# Patient Record
Sex: FEMALE | Race: WHITE | NOT HISPANIC OR LATINO | URBAN - METROPOLITAN AREA
[De-identification: names, ages, dates, MRNs, and addresses within clinical notes are randomized per-mention and may not be internally consistent; named-entity substitution may affect disease eponyms.]

---

## 2017-01-25 ENCOUNTER — HISTORICAL (OUTPATIENT)
Dept: ADMINISTRATIVE | Facility: HOSPITAL | Age: 72
End: 2017-01-25

## 2017-03-14 ENCOUNTER — HISTORICAL (OUTPATIENT)
Dept: RADIOLOGY | Facility: HOSPITAL | Age: 72
End: 2017-03-14

## 2017-08-15 ENCOUNTER — HISTORICAL (OUTPATIENT)
Dept: ADMINISTRATIVE | Facility: HOSPITAL | Age: 72
End: 2017-08-15

## 2018-01-08 ENCOUNTER — HISTORICAL (OUTPATIENT)
Dept: LAB | Facility: HOSPITAL | Age: 73
End: 2018-01-08

## 2018-01-09 ENCOUNTER — HISTORICAL (OUTPATIENT)
Dept: ADMINISTRATIVE | Facility: HOSPITAL | Age: 73
End: 2018-01-09

## 2018-08-20 ENCOUNTER — HISTORICAL (OUTPATIENT)
Dept: RADIOLOGY | Facility: HOSPITAL | Age: 73
End: 2018-08-20

## 2018-09-20 ENCOUNTER — HISTORICAL (OUTPATIENT)
Dept: RADIOLOGY | Facility: HOSPITAL | Age: 73
End: 2018-09-20

## 2019-03-15 ENCOUNTER — HISTORICAL (OUTPATIENT)
Dept: RADIOLOGY | Facility: HOSPITAL | Age: 74
End: 2019-03-15

## 2019-07-31 ENCOUNTER — HISTORICAL (OUTPATIENT)
Dept: RADIOLOGY | Facility: HOSPITAL | Age: 74
End: 2019-07-31

## 2019-10-02 ENCOUNTER — HISTORICAL (OUTPATIENT)
Dept: ADMINISTRATIVE | Facility: HOSPITAL | Age: 74
End: 2019-10-02

## 2019-10-02 LAB
ABS NEUT (OLG): 2.71 X10(3)/MCL (ref 2.1–9.2)
ALBUMIN SERPL-MCNC: 4 GM/DL (ref 3.4–5)
ALBUMIN/GLOB SERPL: 1.4 RATIO (ref 1.1–2)
ALP SERPL-CCNC: 76 UNIT/L (ref 38–126)
ALT SERPL-CCNC: 35 UNIT/L (ref 12–78)
APPEARANCE, UA: CLEAR
AST SERPL-CCNC: 24 UNIT/L (ref 15–37)
BACTERIA SPEC CULT: NORMAL /HPF
BASOPHILS # BLD AUTO: 0 X10(3)/MCL (ref 0–0.2)
BASOPHILS NFR BLD AUTO: 1 %
BILIRUB SERPL-MCNC: 0.3 MG/DL (ref 0.2–1)
BILIRUB UR QL STRIP: NEGATIVE
BILIRUBIN DIRECT+TOT PNL SERPL-MCNC: 0.1 MG/DL (ref 0–0.5)
BILIRUBIN DIRECT+TOT PNL SERPL-MCNC: 0.2 MG/DL (ref 0–0.8)
BUN SERPL-MCNC: 18 MG/DL (ref 7–18)
CALCIUM SERPL-MCNC: 9.3 MG/DL (ref 8.5–10.1)
CHLORIDE SERPL-SCNC: 108 MMOL/L (ref 98–107)
CHOLEST SERPL-MCNC: 195 MG/DL (ref 0–200)
CHOLEST/HDLC SERPL: 3.4 {RATIO} (ref 0–4)
CO2 SERPL-SCNC: 29 MMOL/L (ref 21–32)
COLOR UR: YELLOW
CREAT SERPL-MCNC: 0.91 MG/DL (ref 0.55–1.02)
CREAT UR-MCNC: 143 MG/DL
DEPRECATED CALCIDIOL+CALCIFEROL SERPL-MC: 32.69 NG/ML (ref 30–80)
EOSINOPHIL # BLD AUTO: 0.1 X10(3)/MCL (ref 0–0.9)
EOSINOPHIL NFR BLD AUTO: 1 %
ERYTHROCYTE [DISTWIDTH] IN BLOOD BY AUTOMATED COUNT: 12.9 % (ref 11.5–17)
GLOBULIN SER-MCNC: 2.9 GM/DL (ref 2.4–3.5)
GLUCOSE (UA): NEGATIVE
GLUCOSE SERPL-MCNC: 91 MG/DL (ref 74–106)
HCT VFR BLD AUTO: 37.6 % (ref 37–47)
HDLC SERPL-MCNC: 58 MG/DL (ref 35–60)
HGB BLD-MCNC: 11.8 GM/DL (ref 12–16)
HGB UR QL STRIP: NEGATIVE
KETONES UR QL STRIP: NEGATIVE
LDLC SERPL CALC-MCNC: 88 MG/DL (ref 0–129)
LEUKOCYTE ESTERASE UR QL STRIP: NEGATIVE
LYMPHOCYTES # BLD AUTO: 2.7 X10(3)/MCL (ref 0.6–4.6)
LYMPHOCYTES NFR BLD AUTO: 45 %
MCH RBC QN AUTO: 30.7 PG (ref 27–31)
MCHC RBC AUTO-ENTMCNC: 31.4 GM/DL (ref 33–36)
MCV RBC AUTO: 97.9 FL (ref 80–94)
MICROALBUMIN UR-MCNC: 1.5 MG/DL
MICROALBUMIN/CREAT RATIO PNL UR: 10.5 MG/GM CR (ref 0–30)
MONOCYTES # BLD AUTO: 0.5 X10(3)/MCL (ref 0.1–1.3)
MONOCYTES NFR BLD AUTO: 8 %
NEUTROPHILS # BLD AUTO: 2.71 X10(3)/MCL (ref 2.1–9.2)
NEUTROPHILS NFR BLD AUTO: 45 %
NITRITE UR QL STRIP: NEGATIVE
PH UR STRIP: 5 [PH] (ref 5–9)
PLATELET # BLD AUTO: 294 X10(3)/MCL (ref 130–400)
PMV BLD AUTO: 10.6 FL (ref 9.4–12.4)
POTASSIUM SERPL-SCNC: 4.9 MMOL/L (ref 3.5–5.1)
PROT SERPL-MCNC: 6.9 GM/DL (ref 6.4–8.2)
PROT UR QL STRIP: NEGATIVE
RBC # BLD AUTO: 3.84 X10(6)/MCL (ref 4.2–5.4)
RBC #/AREA URNS HPF: NORMAL /[HPF]
SODIUM SERPL-SCNC: 142 MMOL/L (ref 136–145)
SP GR UR STRIP: 1.02 (ref 1–1.03)
SQUAMOUS EPITHELIAL, UA: NORMAL
TRIGL SERPL-MCNC: 244 MG/DL (ref 30–150)
TSH SERPL-ACNC: 1.68 MIU/L (ref 0.36–3.74)
UROBILINOGEN UR STRIP-ACNC: 0.2
VLDLC SERPL CALC-MCNC: 49 MG/DL
WBC # SPEC AUTO: 6 X10(3)/MCL (ref 4.5–11.5)
WBC #/AREA URNS HPF: NORMAL /[HPF]

## 2019-10-10 ENCOUNTER — HISTORICAL (OUTPATIENT)
Dept: RADIOLOGY | Facility: HOSPITAL | Age: 74
End: 2019-10-10

## 2019-10-20 LAB — HEMOCCULT SP1 STL QL: NEGATIVE

## 2019-10-21 LAB — HEMOCCULT SP2 STL QL: NEGATIVE

## 2019-10-23 ENCOUNTER — HISTORICAL (OUTPATIENT)
Dept: LAB | Facility: HOSPITAL | Age: 74
End: 2019-10-23

## 2019-11-04 ENCOUNTER — HISTORICAL (OUTPATIENT)
Dept: RADIOLOGY | Facility: HOSPITAL | Age: 74
End: 2019-11-04

## 2020-07-10 ENCOUNTER — HISTORICAL (OUTPATIENT)
Dept: ADMINISTRATIVE | Facility: HOSPITAL | Age: 75
End: 2020-07-10

## 2020-07-10 LAB
ABS NEUT (OLG): 3.68 X10(3)/MCL (ref 2.1–9.2)
BASOPHILS # BLD AUTO: 0 X10(3)/MCL (ref 0–0.2)
BASOPHILS NFR BLD AUTO: 0 %
CHOLEST SERPL-MCNC: 188 MG/DL
CHOLEST/HDLC SERPL: 4 {RATIO} (ref 0–5)
EOSINOPHIL # BLD AUTO: 0 X10(3)/MCL (ref 0–0.9)
EOSINOPHIL NFR BLD AUTO: 1 %
ERYTHROCYTE [DISTWIDTH] IN BLOOD BY AUTOMATED COUNT: 12.5 % (ref 11.5–17)
HCT VFR BLD AUTO: 36.9 % (ref 37–47)
HDLC SERPL-MCNC: 53 MG/DL (ref 35–60)
HGB BLD-MCNC: 11.8 GM/DL (ref 12–16)
LDLC SERPL CALC-MCNC: 94 MG/DL (ref 50–140)
LYMPHOCYTES # BLD AUTO: 2 X10(3)/MCL (ref 0.6–4.6)
LYMPHOCYTES NFR BLD AUTO: 31 %
MCH RBC QN AUTO: 30.9 PG (ref 27–31)
MCHC RBC AUTO-ENTMCNC: 32 GM/DL (ref 33–36)
MCV RBC AUTO: 96.6 FL (ref 80–94)
MONOCYTES # BLD AUTO: 0.6 X10(3)/MCL (ref 0.1–1.3)
MONOCYTES NFR BLD AUTO: 9 %
NEUTROPHILS # BLD AUTO: 3.68 X10(3)/MCL (ref 2.1–9.2)
NEUTROPHILS NFR BLD AUTO: 58 %
PLATELET # BLD AUTO: 279 X10(3)/MCL (ref 130–400)
PMV BLD AUTO: 10.7 FL (ref 9.4–12.4)
RBC # BLD AUTO: 3.82 X10(6)/MCL (ref 4.2–5.4)
TRIGL SERPL-MCNC: 205 MG/DL (ref 37–140)
VLDLC SERPL CALC-MCNC: 41 MG/DL
WBC # SPEC AUTO: 6.3 X10(3)/MCL (ref 4.5–11.5)

## 2021-01-05 ENCOUNTER — HISTORICAL (OUTPATIENT)
Dept: ADMINISTRATIVE | Facility: HOSPITAL | Age: 76
End: 2021-01-05

## 2021-01-05 LAB
ALBUMIN SERPL-MCNC: 4.5 G/DL (ref 3.7–4.7)
ALBUMIN/GLOB SERPL: 1.9 {RATIO} (ref 1.2–2.2)
ALP SERPL-CCNC: 72 IU/L (ref 39–117)
ALT SERPL-CCNC: 15 IU/L (ref 0–32)
AST SERPL-CCNC: 15 IU/L (ref 0–40)
BASOPHILS # BLD AUTO: 0 X10E3/UL (ref 0–0.2)
BASOPHILS NFR BLD AUTO: 1 %
BILIRUB SERPL-MCNC: 0.3 MG/DL (ref 0–1.2)
BUN SERPL-MCNC: 19 MG/DL (ref 8–27)
CALCIUM SERPL-MCNC: 10.5 MG/DL (ref 8.7–10.3)
CHLORIDE SERPL-SCNC: 102 MMOL/L (ref 96–106)
CHOLEST SERPL-MCNC: 242 MG/DL (ref 100–199)
CHOLEST/HDLC SERPL: 3.7 RATIO (ref 0–4.4)
CO2 SERPL-SCNC: 23 MMOL/L (ref 20–29)
CREAT SERPL-MCNC: 1.2 MG/DL (ref 0.57–1)
CREAT/UREA NIT SERPL: 16 (ref 12–28)
DEPRECATED CALCIDIOL+CALCIFEROL SERPL-MC: 34.8 NG/ML (ref 30–100)
EOSINOPHIL # BLD AUTO: 0.1 X10E3/UL (ref 0–0.4)
EOSINOPHIL NFR BLD AUTO: 1 %
ERYTHROCYTE [DISTWIDTH] IN BLOOD BY AUTOMATED COUNT: 13.2 % (ref 11.7–15.4)
GLOBULIN SER-MCNC: 2.4 G/DL (ref 1.5–4.5)
GLUCOSE SERPL-MCNC: 96 MG/DL (ref 65–99)
HCT VFR BLD AUTO: 37.9 % (ref 34–46.6)
HDLC SERPL-MCNC: 66 MG/DL
HGB BLD-MCNC: 12.1 G/DL (ref 11.1–15.9)
LDLC SERPL CALC-MCNC: 126 MG/DL (ref 0–99)
LYMPHOCYTES # BLD AUTO: 2.6 X10E3/UL (ref 0.7–3.1)
LYMPHOCYTES NFR BLD AUTO: 40 %
MCH RBC QN AUTO: 31.2 PG (ref 26.6–33)
MCHC RBC AUTO-ENTMCNC: 31.9 G/DL (ref 31.5–35.7)
MCV RBC AUTO: 98 FL (ref 79–97)
MONOCYTES # BLD AUTO: 0.6 X10E3/UL (ref 0.1–0.9)
MONOCYTES NFR BLD AUTO: 8 %
NEUTROPHILS # BLD AUTO: 3.3 X10E3/UL (ref 1.4–7)
NEUTROPHILS NFR BLD AUTO: 50 %
PLATELET # BLD AUTO: 293 X10E3/UL (ref 150–450)
POTASSIUM SERPL-SCNC: 5.2 MMOL/L (ref 3.5–5.2)
PROT SERPL-MCNC: 6.9 G/DL (ref 6–8.5)
RBC # BLD AUTO: 3.88 X10(6)/MCL (ref 3.77–5.28)
SODIUM SERPL-SCNC: 141 MMOL/L (ref 134–144)
TRIGL SERPL-MCNC: 286 MG/DL (ref 0–149)
TSH SERPL-ACNC: 3.05 MIU/ML (ref 0.45–4.5)
VLDLC SERPL CALC-MCNC: 50 MG/DL (ref 5–40)
WBC # SPEC AUTO: 6.7 X10E3/UL (ref 3.4–10.8)

## 2022-01-17 ENCOUNTER — HISTORICAL (OUTPATIENT)
Dept: ADMINISTRATIVE | Facility: HOSPITAL | Age: 77
End: 2022-01-17

## 2022-01-17 LAB
BASOPHILS # BLD AUTO: 0 X10E3/UL (ref 0–0.2)
BASOPHILS NFR BLD AUTO: 1 %
DEPRECATED CALCIDIOL+CALCIFEROL SERPL-MC: 28.4 NG/ML (ref 30–100)
EOSINOPHIL # BLD AUTO: 0.1 X10E3/UL (ref 0–0.4)
EOSINOPHIL NFR BLD AUTO: 2 %
ERYTHROCYTE [DISTWIDTH] IN BLOOD BY AUTOMATED COUNT: 13 % (ref 11.7–15.4)
HBA1C MFR BLD: 6 % (ref 4.8–5.6)
HCT VFR BLD AUTO: 37.6 % (ref 34–46.6)
HGB BLD-MCNC: 11.8 G/DL (ref 11.1–15.9)
LYMPHOCYTES # BLD AUTO: 2.5 X10E3/UL (ref 0.7–3.1)
LYMPHOCYTES NFR BLD AUTO: 41 %
MCH RBC QN AUTO: 31.2 PG (ref 26.6–33)
MCHC RBC AUTO-ENTMCNC: 31.4 G/DL (ref 31.5–35.7)
MCV RBC AUTO: 100 FL (ref 79–97)
MONOCYTES # BLD AUTO: 0.6 X10E3/UL (ref 0.1–0.9)
MONOCYTES NFR BLD AUTO: 10 %
NEUTROPHILS # BLD AUTO: 2.9 X10E3/UL (ref 1.4–7)
NEUTROPHILS NFR BLD AUTO: 46 %
PLATELET # BLD AUTO: 319 X10E3/UL (ref 150–450)
RBC # BLD AUTO: 3.78 X10(6)/MCL (ref 3.77–5.28)
TSH SERPL-ACNC: 4.18 MIU/ML (ref 0.45–4.5)
WBC # SPEC AUTO: 6.1 X10E3/UL (ref 3.4–10.8)

## 2022-04-11 ENCOUNTER — HISTORICAL (OUTPATIENT)
Dept: ADMINISTRATIVE | Facility: HOSPITAL | Age: 77
End: 2022-04-11

## 2022-04-29 VITALS
OXYGEN SATURATION: 97 % | BODY MASS INDEX: 26.01 KG/M2 | DIASTOLIC BLOOD PRESSURE: 75 MMHG | SYSTOLIC BLOOD PRESSURE: 139 MMHG | WEIGHT: 141.31 LBS | HEIGHT: 62 IN

## 2022-07-22 PROBLEM — E04.1 THYROID NODULE: Status: ACTIVE | Noted: 2022-07-22

## 2022-07-22 PROBLEM — Z78.0 POSTMENOPAUSAL: Status: ACTIVE | Noted: 2022-07-22

## 2022-07-22 PROBLEM — I10 HYPERTENSION: Chronic | Status: ACTIVE | Noted: 2022-07-22

## 2022-07-22 PROBLEM — E11.9 TYPE 2 DIABETES MELLITUS: Status: ACTIVE | Noted: 2022-07-22

## 2022-07-22 PROBLEM — K21.9 GASTROESOPHAGEAL REFLUX DISEASE: Status: ACTIVE | Noted: 2022-07-22

## 2022-07-22 PROBLEM — E78.00 PURE HYPERCHOLESTEROLEMIA: Status: ACTIVE | Noted: 2022-07-22

## 2022-07-22 PROBLEM — K21.9 GASTROESOPHAGEAL REFLUX DISEASE: Chronic | Status: ACTIVE | Noted: 2022-07-22

## 2022-07-22 PROBLEM — M19.012 OSTEOARTHRITIS OF LEFT SHOULDER: Chronic | Status: ACTIVE | Noted: 2022-07-22

## 2022-07-22 PROBLEM — M19.012 OSTEOARTHRITIS OF LEFT SHOULDER: Status: ACTIVE | Noted: 2022-07-22

## 2022-07-22 PROBLEM — E11.9 TYPE 2 DIABETES MELLITUS: Chronic | Status: ACTIVE | Noted: 2022-07-22

## 2022-07-22 PROBLEM — Z78.0 POSTMENOPAUSAL: Chronic | Status: ACTIVE | Noted: 2022-07-22

## 2022-07-22 PROBLEM — E78.00 PURE HYPERCHOLESTEROLEMIA: Chronic | Status: ACTIVE | Noted: 2022-07-22

## 2022-07-22 PROBLEM — E04.1 THYROID NODULE: Chronic | Status: ACTIVE | Noted: 2022-07-22

## 2022-07-22 PROBLEM — I10 HYPERTENSION: Status: ACTIVE | Noted: 2022-07-22

## 2022-08-16 RX ORDER — CLOBETASOL PROPIONATE 0.5 MG/G
CREAM TOPICAL DAILY PRN
Qty: 30 G | Refills: 0 | Status: SHIPPED | OUTPATIENT
Start: 2022-08-16 | End: 2022-11-02 | Stop reason: SDUPTHER

## 2022-08-16 NOTE — TELEPHONE ENCOUNTER
Requested to encourage patient follow up in office for evaluation.    ----- Message from Cass Hogan LPN sent at 8/16/2022  8:46 AM CDT -----  Regarding: FW: Rx refill  Is this a patient of yours? Please advise. Thank you.       ----- Message -----  From: Darlene Obando  Sent: 8/15/2022   2:19 PM CDT  To: [facility]  Subject: Rx refill                                        Pt requesting refill of clobetasol propionate be called in to [facility] on [address] (ph [phone]

## 2022-11-02 RX ORDER — CLOBETASOL PROPIONATE 0.5 MG/G
CREAM TOPICAL DAILY PRN
Qty: 30 G | Refills: 3 | Status: SHIPPED | OUTPATIENT
Start: 2022-11-02 | End: 2023-02-03 | Stop reason: SDUPTHER

## 2023-02-03 ENCOUNTER — OFFICE VISIT (OUTPATIENT)
Dept: GYNECOLOGY | Facility: CLINIC | Age: 78
End: 2023-02-03
Payer: MEDICARE

## 2023-02-03 VITALS
OXYGEN SATURATION: 99 % | BODY MASS INDEX: 26.68 KG/M2 | TEMPERATURE: 98 F | HEIGHT: 62 IN | HEART RATE: 82 BPM | SYSTOLIC BLOOD PRESSURE: 147 MMHG | DIASTOLIC BLOOD PRESSURE: 82 MMHG | WEIGHT: 145 LBS | RESPIRATION RATE: 18 BRPM

## 2023-02-03 DIAGNOSIS — L29.2 PRURITUS VULVAE: ICD-10-CM

## 2023-02-03 DIAGNOSIS — Z01.419 ROUTINE GYNECOLOGICAL EXAMINATION: Primary | ICD-10-CM

## 2023-02-03 DIAGNOSIS — Z12.31 VISIT FOR SCREENING MAMMOGRAM: ICD-10-CM

## 2023-02-03 DIAGNOSIS — N32.81 DETRUSOR INSTABILITY OF BLADDER: ICD-10-CM

## 2023-02-03 PROCEDURE — 99215 OFFICE O/P EST HI 40 MIN: CPT | Mod: PBBFAC | Performed by: OBSTETRICS & GYNECOLOGY

## 2023-02-03 RX ORDER — TOLTERODINE 4 MG/1
4 CAPSULE, EXTENDED RELEASE ORAL DAILY
Qty: 30 CAPSULE | Refills: 11 | Status: SHIPPED | OUTPATIENT
Start: 2023-02-03 | End: 2023-05-08

## 2023-02-03 RX ORDER — CLOBETASOL PROPIONATE 0.5 MG/G
CREAM TOPICAL DAILY PRN
Qty: 30 G | Refills: 3 | Status: SHIPPED | OUTPATIENT
Start: 2023-02-03

## 2023-02-03 NOTE — PROGRESS NOTES
Subjective:       Patient ID: Nery Benites is a 78 y.o. female.    Chief Complaint:  Well Woman    History of Present Illness:  Presents for gyn wellness visit with complaints of memory loss and urinary incontinence - mixed.  She is aware of benefits of Kegels and pelvic floor PT, but is not interested.  Periods are absent. She denies vaginal discharge, breast masses or other changes of concern.  She uses clobetasol successfully intermittently for itch of the labia.    GYN & OB History  No LMP recorded (exact date). Patient has had a hysterectomy.   Date of Last Pap: No result found    OB History    Para Term  AB Living   6 5           SAB IAB Ectopic Multiple Live Births                  # Outcome Date GA Lbr [place]/2nd Weight Sex Delivery Anes PTL Lv   6             5 Para            4 Para            3 Para            2 Para            1 Para                Past Medical History:   Diagnosis Date    Gastroesophageal reflux disease 2022    Hypertension 2022    Osteoarthritis of left shoulder 2022    Postmenopausal 2022    Pure hypercholesterolemia 2022    Thyroid nodule 2022    Type 2 diabetes mellitus 2022      Past Surgical History:   Procedure Laterality Date    DILATION AND CURETTAGE OF UTERUS N/A     X 3    LAPAROSCOPIC CHOLECYSTECTOMY N/A 10/2012    THYROIDECTOMY, PARTIAL Left 2016    TOTAL ABDOMINAL HYSTERECTOMY          Current Outpatient Medications:     amitriptyline (ELAVIL) 25 MG tablet, Take 25 mg by mouth nightly., Disp: , Rfl:     cetirizine (ZYRTEC) 10 MG tablet, Take 10 mg by mouth., Disp: , Rfl:     famotidine (PEPCID) 40 MG tablet, Take 40 mg by mouth once daily., Disp: , Rfl:     metFORMIN (GLUCOPHAGE-XR) 750 MG ER 24hr tablet, Take 750 mg by mouth daily with breakfast., Disp: , Rfl:     metoprolol succinate (TOPROL-XL) 25 MG 24 hr tablet, Take 25 mg by mouth once daily., Disp: , Rfl:     pantoprazole (PROTONIX) 20 MG tablet, TAKE 1  TABLET BY MOUTH EVERY MORNING 30 MINUTES BEFORE BREAKFAST, Disp: , Rfl:     REPATHA SURECLICK 140 mg/mL PnIj, every 14 (fourteen) days., Disp: , Rfl:     vitamin D (VITAMIN D3) 1000 units Tab, Take 1,000 Units by mouth once daily., Disp: , Rfl:     clobetasoL (TEMOVATE) 0.05 % cream, Apply topically daily as needed (itch or burning)., Disp: 30 g, Rfl: 3    tolterodine (DETROL LA) 4 MG 24 hr capsule, Take 1 capsule (4 mg total) by mouth once daily., Disp: 30 capsule, Rfl: 11     Review of patient's allergies indicates:   Allergen Reactions    Sulfa (sulfonamide antibiotics)      Other reaction(s): Swelling        Social History     Socioeconomic History    Marital status:    Tobacco Use    Smoking status: Former    Smokeless tobacco: Current    Tobacco comments:     vaping   Substance and Sexual Activity    Alcohol use: Yes    Drug use: Never    Sexual activity: Not Currently        Immunization History   Administered Date(s) Administered    COVID-19, MRNA, LN-S, PF (Pfizer) (Purple Cap) 01/15/2021, 02/05/2021, 09/27/2021    Influenza (FLUAD) - Quadrivalent - Adjuvanted - PF *Preferred* (65+) 11/02/2020    Influenza - High Dose - PF (65 years and older) 10/24/2018    Influenza - Quadrivalent - High Dose - PF (65 years and older) 11/10/2021    Influenza - Quadrivalent - PF *Preferred* (6 months and older) 09/27/2009, 09/05/2011, 11/10/2012    Influenza - Trivalent - PF (ADULT) 09/27/2009, 09/05/2011, 11/10/2012, 10/14/2019    Pneumococcal Conjugate - 13 Valent 07/10/2019    Pneumococcal Polysaccharide - 23 Valent 07/13/2020        There are no preventive care reminders to display for this patient.     Review of Systems  Review of Systems        Objective:     Vitals:    02/03/23 1115   BP: (!) 147/82   Pulse: 82   Resp: 18   Temp: 98.2 °F (36.8 °C)       Physical Exam:   Constitutional: She is oriented to person, place, and time. She appears well-developed and well-nourished.    HENT:   Head: Normocephalic and  atraumatic.    Eyes: Pupils are equal, round, and reactive to light. Conjunctivae and EOM are normal.    Neck: No thyromegaly present.    Cardiovascular:  Normal rate, regular rhythm and normal heart sounds.            No murmur heard.   Pulmonary/Chest: Effort normal and breath sounds normal. She has no wheezes. She has no rales.        Abdominal: Soft. She exhibits no distension and no mass. There is no abdominal tenderness. No hernia.     Genitourinary:    Inguinal canal, right adnexa and left adnexa normal.   Rectum:      No external hemorrhoid.   The external female genitalia was normal.   Labial bartholins normal.There is no lesion on the right labia. There is no lesion on the left labia. Vagina exhibits no lesion. Vaginal cuff normal.  No erythema,  no vaginal discharge, tenderness, rectocele or cystocele in the vagina. Cervix is absent.Uterus is absent. Normal urethral meatus.Bladder findings: no bladder distention and no bladder tenderness             Lymphadenopathy:     She has no cervical adenopathy.    Neurological: She is alert and oriented to person, place, and time.    Skin: Skin is warm and dry. No rash noted.    Psychiatric: She has a normal mood and affect. Her behavior is normal.        Assessment:        1. Routine gynecological examination    2. Visit for screening mammogram    3. Pruritus vulvae    4. Detrusor instability of bladder                Plan:      Problem List Items Addressed This Visit    None  Visit Diagnoses       Routine gynecological examination    -  Primary    Visit for screening mammogram        Relevant Orders    Mammo Digital Screening Bilat w/ Prasanna    Pruritus vulvae        Relevant Medications    clobetasoL (TEMOVATE) 0.05 % cream    Detrusor instability of bladder               Medications Ordered This Encounter   Medications    clobetasoL (TEMOVATE) 0.05 % cream     Sig: Apply topically daily as needed (itch or burning).     Dispense:  30 g     Refill:  3     tolterodine (DETROL LA) 4 MG 24 hr capsule     Sig: Take 1 capsule (4 mg total) by mouth once daily.     Dispense:  30 capsule     Refill:  11      Follow up in 2 years (on 2/3/2025).   SBE

## 2023-05-08 PROBLEM — N18.31 CHRONIC KIDNEY DISEASE, STAGE 3A: Status: ACTIVE | Noted: 2023-05-08

## 2023-05-15 LAB — BCS RECOMMENDATION EXT: NORMAL

## 2023-10-23 PROBLEM — N18.31 CHRONIC KIDNEY DISEASE, STAGE 3A: Chronic | Status: ACTIVE | Noted: 2023-05-08

## 2024-05-15 ENCOUNTER — HOSPITAL ENCOUNTER (OUTPATIENT)
Dept: RADIOLOGY | Facility: HOSPITAL | Age: 79
Discharge: HOME | End: 2024-05-15
Attending: INTERNAL MEDICINE
Payer: MEDICARE

## 2024-05-15 DIAGNOSIS — N18.31 CHRONIC KIDNEY DISEASE, STAGE 3A: Chronic | ICD-10-CM

## 2024-05-15 PROCEDURE — 76775 US EXAM ABDO BACK WALL LIM: CPT | Mod: TC

## 2024-07-30 DIAGNOSIS — L29.2 PRURITUS VULVAE: ICD-10-CM

## 2024-07-30 RX ORDER — CLOBETASOL PROPIONATE 0.5 MG/G
CREAM TOPICAL DAILY PRN
Qty: 30 G | Refills: 3 | OUTPATIENT
Start: 2024-07-30

## 2024-07-30 NOTE — TELEPHONE ENCOUNTER
Refill request came through the faxes on patients clobetasol cream.     Last annual: 2/3/23.   No future annual scheduled.

## 2024-08-22 NOTE — PROGRESS NOTES
Subjective:       Patient ID: Nery Benites is a 79 y.o. female.    Chief Complaint:  Well Woman    History of Present Illness:  Presents for gyn wellness visit without complaints.  Periods are absent. She denies vaginal discharge, breast masses or other changes of concern. She has continue perineal itch from suspected LSA successfully managed with Clobetasol.    GYN & OB History  No LMP recorded. Patient has had a hysterectomy.   Date of Last Pap: No result found    OB History    Para Term  AB Living   6 5           SAB IAB Ectopic Multiple Live Births                  # Outcome Date GA Lbr [place]/2nd Weight Sex Type Anes PTL Lv   6             5 Para            4 Para            3 Para            2 Para            1 Para                Past Medical History:   Diagnosis Date    Gastroesophageal reflux disease 2022    Hypertension 2022    Osteoarthritis of left shoulder 2022    Postmenopausal 2022    Pure hypercholesterolemia 2022    Thyroid nodule 2022    Type 2 diabetes mellitus 2022      Past Surgical History:   Procedure Laterality Date    DILATION AND CURETTAGE OF UTERUS N/A     X 3    LAPAROSCOPIC CHOLECYSTECTOMY N/A 10/2012    THYROIDECTOMY, PARTIAL Left 2016    TOTAL ABDOMINAL HYSTERECTOMY  1983        Current Outpatient Medications:     amitriptyline (ELAVIL) 25 MG tablet, Take 1 tablet (25 mg total) by mouth nightly., Disp: 30 tablet, Rfl: 1    famotidine (PEPCID) 40 MG tablet, Take 40 mg by mouth once daily., Disp: , Rfl:     ipratropium (ATROVENT) 42 mcg (0.06 %) nasal spray, USE 2 SPRAYS IN EACH NOSTRIL EVERY 8 HOURS AS NEEDED FOR CLEAR RUNNY NOSE., Disp: , Rfl:     metFORMIN (GLUCOPHAGE-XR) 750 MG ER 24hr tablet, TAKE 1 TABLET(750 MG) BY MOUTH DAILY WITH BREAKFAST, Disp: 30 tablet, Rfl: 2    metoprolol succinate (TOPROL-XL) 50 MG 24 hr tablet, Take 50 mg by mouth., Disp: , Rfl:     pantoprazole (PROTONIX) 40 MG tablet, 1 tablet  every other day., Disp: , Rfl:     REPATHA SURECLICK 140 mg/mL PnIj, every 14 (fourteen) days., Disp: , Rfl:     rOPINIRole (REQUIP) 0.5 MG tablet, TAKE 1/2 TABLET BY MOUTH EVERY NIGHT AT BEDTIME FOR 2 DAYS. INCREASE TO 1 TABLET DAILY THEREAFTER, Disp: 30 tablet, Rfl: 11    tolterodine (DETROL LA) 2 MG Cp24, Take 4 mg by mouth 2 (two) times a day., Disp: , Rfl:     clobetasoL (TEMOVATE) 0.05 % cream, Apply topically daily as needed (itch or burning)., Disp: 30 g, Rfl: 5    metoprolol succinate (TOPROL-XL) 25 MG 24 hr tablet, Take 25 mg by mouth., Disp: , Rfl:      Review of patient's allergies indicates:   Allergen Reactions    Sulfa (sulfonamide antibiotics)      Other reaction(s): Swelling        Social History     Socioeconomic History    Marital status:    Tobacco Use    Smoking status: Former    Smokeless tobacco: Current    Tobacco comments:     vaping   Substance and Sexual Activity    Alcohol use: Yes    Drug use: Never    Sexual activity: Not Currently     Social Determinants of Health     Financial Resource Strain: Low Risk  (7/30/2024)    Overall Financial Resource Strain (CARDIA)     Difficulty of Paying Living Expenses: Not hard at all   Food Insecurity: No Food Insecurity (7/30/2024)    Hunger Vital Sign     Worried About Running Out of Food in the Last Year: Never true     Ran Out of Food in the Last Year: Never true   Physical Activity: Insufficiently Active (7/30/2024)    Exercise Vital Sign     Days of Exercise per Week: 2 days     Minutes of Exercise per Session: 20 min   Stress: No Stress Concern Present (7/30/2024)    [employer] - Occupational Stress Questionnaire     Feeling of Stress : Not at all   Housing Stability: Unknown (7/30/2024)    Housing Stability Vital Sign     Unable to Pay for Housing in the Last Year: No        Immunization History   Administered Date(s) Administered    COVID-19, MRNA, LN-S, PF (Pfizer) (Purple Cap)  01/15/2021, 02/05/2021, 09/27/2021    COVID-19, mRNA, LNP-S, PF, cherie-sucrose, 30 mcg/0.3 mL (Pfizer 2023 Ages 12+) 10/28/2023    COVID-19, mRNA, LNP-S, bivalent booster, PF (PFIZER OMICRON) 12/01/2022    Influenza (FLUAD) - Quadrivalent - Adjuvanted - PF *Preferred* (65+) 11/02/2020    Influenza - High Dose - PF (65 years and older) 10/24/2018    Influenza - Quadrivalent - High Dose - PF (65 years and older) 11/10/2021, 10/28/2023    Influenza - Quadrivalent - MDCK 11/07/2022    Influenza - Quadrivalent - PF *Preferred* (6 months and older) 09/27/2009, 09/05/2011, 11/10/2012    Influenza - Trivalent (ADULT) 09/27/2009, 09/05/2011, 11/10/2012, 10/10/2021    Influenza - Trivalent - PF (ADULT) 09/27/2009, 09/05/2011, 11/10/2012, 10/14/2019    Pneumococcal Conjugate - 13 Valent 07/10/2019    Pneumococcal Polysaccharide - 23 Valent 07/13/2020    Rsv, Bivalent, Rsvpref (Abrysvo) 10/28/2023, 10/28/2023        Health Maintenance Due   Topic Date Due    COVID-19 Vaccine (6 - 2023-24 season) 02/28/2024        Review of Systems  Review of Systems        Objective:     Vitals:    08/23/24 1037   Pulse: 69   Resp: 18   Temp: 98.3 °F (36.8 °C)       Physical Exam:   Constitutional: She is oriented to person, place, and time. She appears well-developed and well-nourished.    HENT:   Head: Normocephalic and atraumatic.    Eyes: Pupils are equal, round, and reactive to light. Conjunctivae and EOM are normal.    Neck: No thyromegaly present.    Cardiovascular:  Normal rate, regular rhythm and normal heart sounds.            No murmur heard.   Pulmonary/Chest: Effort normal and breath sounds normal. She has no wheezes. She has no rales. She exhibits no mass and no edema. Right breast exhibits no mass, no nipple discharge, no skin change and no tenderness. Left breast exhibits no mass, no nipple discharge, no skin change and no tenderness.        Abdominal: Soft. She exhibits no distension and no mass. There is no  abdominal tenderness. No hernia.     Genitourinary:    Inguinal canal, right adnexa and left adnexa normal.   Rectum:      No external hemorrhoid.   The external female genitalia was normal.     Labial bartholins normal.There is no lesion on the right labia. There is no lesion on the left labia. Vagina exhibits no lesion. No erythema, vaginal discharge, tenderness, rectocele or cystocele in the vagina. Cervix is absent.Uterus is absent. Normal urethral meatus.Bladder findings: no bladder distention and no bladder tenderness             Lymphadenopathy:     She has no cervical adenopathy.    Neurological: She is alert and oriented to person, place, and time.    Skin: Skin is warm and dry. No rash noted.    Psychiatric: She has a normal mood and affect. Her behavior is normal.          Assessment:        1. Routine gynecological examination    2. Visit for screening mammogram    3. Pruritus vulvae                Plan:      Problem List Items Addressed This Visit    None  Visit Diagnoses       Routine gynecological examination    -  Primary    Visit for screening mammogram        Relevant Orders    Mammo Digital Screening Bilat w/ Prasanna    Pruritus vulvae        Relevant Medications    clobetasoL (TEMOVATE) 0.05 % cream           Medications Ordered This Encounter   Medications    clobetasoL (TEMOVATE) 0.05 % cream     Sig: Apply topically daily as needed (itch or burning).     Dispense:  30 g     Refill:  5      Follow up in about 1 year (around 8/23/2025) for prescription refill.   SBE

## 2024-08-23 ENCOUNTER — OFFICE VISIT (OUTPATIENT)
Dept: GYNECOLOGY | Facility: CLINIC | Age: 79
End: 2024-08-23
Payer: MEDICARE

## 2024-08-23 VITALS
BODY MASS INDEX: 24.84 KG/M2 | RESPIRATION RATE: 18 BRPM | HEART RATE: 69 BPM | TEMPERATURE: 98 F | HEIGHT: 62 IN | WEIGHT: 135 LBS | OXYGEN SATURATION: 100 %

## 2024-08-23 DIAGNOSIS — L29.2 PRURITUS VULVAE: ICD-10-CM

## 2024-08-23 DIAGNOSIS — Z01.419 ROUTINE GYNECOLOGICAL EXAMINATION: Primary | ICD-10-CM

## 2024-08-23 DIAGNOSIS — Z12.31 VISIT FOR SCREENING MAMMOGRAM: ICD-10-CM

## 2024-08-23 PROCEDURE — 99214 OFFICE O/P EST MOD 30 MIN: CPT | Mod: PBBFAC | Performed by: OBSTETRICS & GYNECOLOGY

## 2024-08-23 RX ORDER — METOPROLOL SUCCINATE 50 MG/1
50 TABLET, EXTENDED RELEASE ORAL
COMMUNITY
Start: 2024-08-02

## 2024-08-23 RX ORDER — CLOBETASOL PROPIONATE 0.5 MG/G
CREAM TOPICAL DAILY PRN
Qty: 30 G | Refills: 5 | Status: SHIPPED | OUTPATIENT
Start: 2024-08-23

## 2025-02-24 ENCOUNTER — EXTERNAL HOME HEALTH (OUTPATIENT)
Dept: HOME HEALTH SERVICES | Facility: HOSPITAL | Age: 80
End: 2025-02-24
Payer: MEDICARE